# Patient Record
Sex: FEMALE | Race: WHITE
[De-identification: names, ages, dates, MRNs, and addresses within clinical notes are randomized per-mention and may not be internally consistent; named-entity substitution may affect disease eponyms.]

---

## 2017-11-16 ENCOUNTER — HOSPITAL ENCOUNTER (EMERGENCY)
Dept: HOSPITAL 17 - PHED | Age: 47
Discharge: HOME | End: 2017-11-16
Payer: MEDICARE

## 2017-11-16 VITALS
TEMPERATURE: 99.3 F | HEART RATE: 90 BPM | DIASTOLIC BLOOD PRESSURE: 101 MMHG | SYSTOLIC BLOOD PRESSURE: 161 MMHG | OXYGEN SATURATION: 99 % | RESPIRATION RATE: 16 BRPM

## 2017-11-16 VITALS — SYSTOLIC BLOOD PRESSURE: 153 MMHG | DIASTOLIC BLOOD PRESSURE: 91 MMHG

## 2017-11-16 VITALS — WEIGHT: 231.49 LBS | BODY MASS INDEX: 45.45 KG/M2 | HEIGHT: 60 IN

## 2017-11-16 DIAGNOSIS — R05: Primary | ICD-10-CM

## 2017-11-16 DIAGNOSIS — G80.9: ICD-10-CM

## 2017-11-16 PROCEDURE — 71010: CPT

## 2017-11-16 PROCEDURE — 99284 EMERGENCY DEPT VISIT MOD MDM: CPT

## 2017-11-16 PROCEDURE — 87804 INFLUENZA ASSAY W/OPTIC: CPT

## 2017-11-16 NOTE — RADRPT
EXAM DATE/TIME:  11/16/2017 18:58 

 

[facility] COMPARISON:     

No previous studies available for comparison.

 

                     

INDICATIONS :     

Cough and congestion on set today. 

                     

 

MEDICAL HISTORY :            

Cerebral palsy.    

 

SURGICAL HISTORY :     

None.   

 

ENCOUNTER:     

Initial                                        

 

ACUITY:     

1 day      

 

PAIN SCORE:     

0/10

 

LOCATION:     

Bilateral chest 

 

FINDINGS:     

The lungs demonstrate low volume. The heart size is difficult to assess given the position of the eric
phragms. A focal consolidation is not seen.

 

CONCLUSION:     No acute disease.  

 

 

 

 Tomy Tubbs MD on November 16, 2017 at 19:41           

Board Certified Radiologist.

 This report was verified electronically.

## 2017-11-16 NOTE — PD
HPI


Chief Complaint:  Cold / Flu Symptoms


Time Seen by Provider:  18:51


Travel History


International Travel<30 days:  No


Contact w/Intl Traveler<30days:  No


Traveled to known affect area:  No





History of Present Illness


HPI


45yo F with PMH of cerebral palsy here with c/o cough for 1 day.  Pt is here 

with her mother and care taker.  Mother said she gets URI twice a year and 

normally responds to z-tima.  Pt is her usual self.  Denies any fever, 

retractions, vomiting.  History is limited due to clinical condition of patient.





PFSH


Past Medical History


Cerebral Palsy:  Yes


Diminished Hearing:  Yes


Immunizations Current:  Yes





Social History


Alcohol Use:  No


Tobacco Use:  No


Substance Use:  No





Allergies-Medications


(Allergen,Severity, Reaction):  


Coded Allergies:  


     No Known Allergies (Verified  Allergy, Unknown, 11/16/17)


Reported Meds & Prescriptions





Reported Meds & Active Scripts


Active


Zithromax Z-Tima (Azithromycin) 250 Mg Dspk 250 Mg PO AS DIRECTED


     500 MG (2 tabs) day 1, then 1 tab days 2-5.








Review of Systems


Except as stated in HPI:  all other systems reviewed are Neg





Physical Exam


Narrative


GENERAL: 45yo F not in distress.


SKIN: Focused skin assessment warm/dry.


HEAD: Atraumatic. Normocephalic. 


EYES: Pupils equal and round at 3mm bilaterally.


ENT: No nasal bleeding or discharge.  Mucous membranes pink and moist.  Cerumen 

in bilateral ears.  


NECK: Trachea midline. No JVD. 


CARDIOVASCULAR: Regular rate and rhythm.  No murmur appreciated.


RESPIRATORY: No accessory muscle use.Decreased breath sounds bilateral lower 

lungs.  Could be secondary to effort.  


GASTROINTESTINAL: Abdomen soft, non-tender, nondistended. 


MUSCULOSKELETAL: No obvious deformities. No clubbing.  No cyanosis.  No edema. 


NEUROLOGICAL: Awake and alert. Baseline mental status.  Does not follow command 

normally and nonverbal.





Data


Data


Last Documented VS





Vital Signs








  Date Time  Temp Pulse Resp B/P (MAP) Pulse Ox O2 Delivery O2 Flow Rate FiO2


 


11/16/17 20:35  90 20 153/91 (111) 96   


 


11/16/17 18:18 99.3       








Orders





 Orders


Chest, Single Ap (11/16/17 )


Influenzae A/B Antigen (11/16/17 18:52)


Guaifenesin Liq (Robitussin Liq) (11/16/17 19:15)


Ed Discharge Order (11/16/17 20:31)








[facility]


Medical Decision Making


Medical Screen Exam Complete:  Yes


Emergency Medical Condition:  Yes


Differential Diagnosis


Pneumonia vs. URI vs. bronchitis


Narrative Course


45yo F with cerebral palsy here with cough.  Temp is 99.3F.  O2 sat 99% on RA. 

  Pt seen at end of my shift, will obtain CXR and influenza.  Sign out to next 

team to follow up results and reevaluate.





Diagnosis





 Primary Impression:  


 Cough


Scripts


Azithromycin (Zithromax Z-Tima) 250 Mg Dspk


250 MG PO AS DIRECTED for Infection, #1 DSPK 0 Refills


   500 MG (2 tabs) day 1, then 1 tab days 2-5.


   Prov: Caroline Betts MD         11/16/17











Anaya Salazar DO Nov 16, 2017 18:54

## 2017-11-16 NOTE — PD
Physical Exam


Date Seen by Provider:  Nov 16, 2017


Time Seen by Provider:  19:33


Narrative


Accepted in transfer of care from Dr. Salazar


GENERAL: Well-developed female in no acute distress no respiratory distress


SKIN: Warm and dry.


CARDIOVASCULAR: Regular rate and rhythm without murmurs, gallops, or rubs. 


RESPIRATORY: Breath sounds equal bilaterally. No accessory muscle use.











Data


Data


Last Documented VS





Vital Signs








  Date Time  Temp Pulse Resp B/P (MAP) Pulse Ox O2 Delivery O2 Flow Rate FiO2


 


11/16/17 18:18 99.3 90 16 161/101 (121) 99   








Orders





 Orders


Chest, Single Ap (11/16/17 )


Influenzae A/B Antigen (11/16/17 18:52)


Guaifenesin Liq (Robitussin Liq) (11/16/17 19:15)


Ed Discharge Order (11/16/17 20:31)








[facility]


Medical Record Reviewed:  Yes


Supervised Visit with KAC[facility]:  No


Interpretation(s)


influenza a/b ag: negative





Last Impressions








Chest X-Ray 11/16/17 0000 Signed





Impressions: 





 Service Date/Time:  Thursday, November 16, 2017 18:58 - CONCLUSION: No acute 





 disease.       Tomy Tubbs MD 








Vital Signs








  Date Time  Temp Pulse Resp B/P (MAP) Pulse Ox O2 Delivery O2 Flow Rate FiO2


 


11/16/17 18:18 99.3 90 16 161/101 (121) 99   








Differential Diagnosis


Accepted in transfer of care from Dr. Salazar; please refer to her dictation


Narrative Course


Accepted in transfer of care from Dr. Salazar; follow up on pending CXR/labs and 

disposition 


At 8:27 PM patient and mother informed of x-ray and flu results; mother is 

insistent that patient has this type of presentation twice a year and she does 

not start early on an azithromycin Z-Tima that she becomes ill very quickly and 

that this is her typical presentation of starting with congestion cough low-

grade temperature elevation and then progresses unless she is treated early 

with a Z-Tima.  Mother informed chest x-ray reveals no acute process and flu 

test is negative Z-Tima is provided for possible early bronchitis.


Diagnosis





 Primary Impression:  


 Cough


Referrals:  


Primary Care Physician


call for appointment


Patient Instructions:  General Instructions





***Additional Instruction:  


Encourage/increase fluid hydration


Give acetaminophen/Tylenol for fever 100.4F or greater


Complete course of antibiotic as prescribed


Return to the emergency department for any concerns or change in condition


Follow up with primary care provider


***Med/Other Pt SpecificInfo:  Prescription(s) given


Scripts


Azithromycin (Zithromax Z-Tima) 250 Mg Dspk


250 MG PO AS DIRECTED for Infection, #1 DSPK 0 Refills


   500 MG (2 tabs) day 1, then 1 tab days 2-5.


   Prov: Caroline Betts MD         11/16/17


Disposition:  01 DISCHARGE HOME


Condition:  Stable











Caroline Betts MD Nov 16, 2017 19:34

## 2018-02-19 ENCOUNTER — HOSPITAL ENCOUNTER (EMERGENCY)
Dept: HOSPITAL 17 - PHEFT | Age: 48
Discharge: HOME | End: 2018-02-19
Payer: MEDICARE

## 2018-02-19 VITALS
RESPIRATION RATE: 12 BRPM | TEMPERATURE: 99.5 F | SYSTOLIC BLOOD PRESSURE: 155 MMHG | DIASTOLIC BLOOD PRESSURE: 112 MMHG | HEART RATE: 97 BPM | OXYGEN SATURATION: 98 %

## 2018-02-19 VITALS — WEIGHT: 231.04 LBS | BODY MASS INDEX: 43.62 KG/M2 | HEIGHT: 61 IN

## 2018-02-19 DIAGNOSIS — J40: Primary | ICD-10-CM

## 2018-02-19 DIAGNOSIS — G80.9: ICD-10-CM

## 2018-02-19 PROCEDURE — 99283 EMERGENCY DEPT VISIT LOW MDM: CPT

## 2018-02-19 PROCEDURE — 94664 DEMO&/EVAL PT USE INHALER: CPT

## 2018-02-19 NOTE — PD
HPI


Chief Complaint:  Cold / Flu Symptoms


Time Seen by Provider:  19:35


Travel History


International Travel<30 days:  No


Contact w/Intl Traveler<30days:  No


Traveled to known affect area:  No





History of Present Illness


HPI


Patient comes to the emergency department with family complaining of cough 

ongoing for a week.  Mother reports it got worse last night.  Denies giving 

anything for this.  Denies any fevers, nausea, vomiting, or patient complaining 

of anything.  Patient is nonverbal thus limiting H&P.  Mom reports seeing 

primary care doctor shortly after this started was not prescribed anything as 

it was not that bad.  Mom does not believe patient can do an inhaler but does 

have a nebulizer machine at home that she can use.





PFSH


Past Medical History


Cerebral Palsy:  Yes


Diminished Hearing:  Yes


Immunizations Current:  Yes (UTD)


Tetanus Vaccination:  Unknown


Pregnant?:  Not Pregnant





Social History


Alcohol Use:  No


Tobacco Use:  No


Substance Use:  No





Allergies-Medications


(Allergen,Severity, Reaction):  


Coded Allergies:  


     No Known Allergies (Verified  Allergy, Unknown, 11/16/17)


Reported Meds & Prescriptions





Reported Meds & Active Scripts


Active


Zithromax Z-Tima (Azithromycin) 250 Mg Dspk 250 Mg PO AS DIRECTED


     500 MG (2 tabs) day 1, then 1 tab days 2-5.


Albuterol Neb (Albuterol Sulfate) 2.5 Mg/3 Ml Neb 2.5 Mg NEB Q4HR NEB PRN


Zithromax Z-Tima (Azithromycin) 250 Mg Dspk 250 Mg PO AS DIRECTED


     500 MG (2 tabs) day 1, then 1 tab days 2-5.








Review of Systems


Except as stated in HPI:  all other systems reviewed are Neg





Physical Exam


Narrative


GENERAL: Well-developed, overly nourished, in no acute distress, and non-ill 

appearing.


SKIN: Focused skin assessment warm and dry.


HEAD: Atraumatic. Normocephalic. 


EYES: Pupils equal and round. EOMI. No scleral icterus. No injection or 

drainage. 


ENT: No nasal bleeding or discharge.  Mucous membranes pink and moist.


NECK: Trachea midline. No cervical lymphadenopathy. Supple.  No nuclear 

rigidity.


CARDIOVASCULAR: Regular rate and rhythm.  No murmur appreciated.


RESPIRATORY: No accessory muscle use.  No respiratory distress. Clear to 

auscultation. Breath sounds equal bilaterally.  Hacking cough noted on exam.


MUSCULOSKELETAL: No obvious deformities. No clubbing.  No cyanosis.  No edema.  

Full range of motion.


NEUROLOGICAL: Awake and alert. No obvious cranial nerve deficits.  Motor 

grossly within normal limits.





Data


Data


Last Documented VS





Vital Signs








  Date Time  Temp Pulse Resp B/P (MAP) Pulse Ox O2 Delivery O2 Flow Rate FiO2


 


2/19/18 20:07        


 


2/19/18 19:25 99.5 97 12  98   








Orders





 Orders


Albuterol-Ipratropium Neb (Duoneb Neb) (2/19/18 19:45)


Ed Discharge Order (2/19/18 20:04)








[facility]


Medical Decision Making


Medical Screen Exam Complete:  Yes


Emergency Medical Condition:  Yes


Differential Diagnosis


URI, pneumonia, bronchitis, viral syndrome, cough


Narrative Course


Patients symptom complex is consistent with bronchitis. The patient is non-ill 

appearing and is in no respiratory distress and comfortable. The patient moves 

air well and oxygen saturations are normal. There is no clinical evidence to 

suggest pneumonia at this time. Plan of care and management were discussed with 

the patient who agreed with plan.The patient was instructed to follow up with 

their physician and instructed to return if worsens, progressively worsening 

shortness of breath or difficulty breathing, persistent fever, chest pains or 

discomfort, inability to keep medication or fluids down with or without vomiting

, or as needed.





Patient in no obvious distress upon re-evaluation. Patient's mother was asked 

if they wanted to speak to my attending, which the patient did not wish to do 

at this time.  Any questions/concerns in reference to patient diagnosis/

condition discussed and clarified prior to patient's discharge. Reinforced 

sheer importance of close follow up with patient's primary physician or primary 

care clinic. Instructed patient to return to ED immediately, if symptoms return/

worsen. Patient's mother showed understanding of above instructions.  Further 

instructions and recommendations were detailed in discharge paperwork.  Patient 

ambulated without difficulty out of ED at discharge.





Diagnosis





 Primary Impression:  


 Bronchitis


Patient Instructions:  Acute Bronchitis (ED), General Instructions





***Additional Instructions:  


Follow-up with your primary care physician in 3-5 days for reevaluation.  Take 

all medication as prescribed.  Encourage plenty of non-caffeinated and 

nonalcoholic fluids.  Return to the emergency department if symptoms get worse.


***Med/Other Pt SpecificInfo:  Prescription(s) given


Scripts


Azithromycin (Zithromax Z-Tima) 250 Mg Dspk


250 MG PO AS DIRECTED for Infection, #1 DSPK 0 Refills


   500 MG (2 tabs) day 1, then 1 tab days 2-5.


   Prov: Michael Carballo MD         2/19/18 


Albuterol Neb (Albuterol Neb) 2.5 Mg/3 Ml Neb


2.5 MG NEB Q4HR NEB Y for COUGH, #60 NEBULE 0 Refills


   Prov: Michael Carballo MD         2/19/18


Disposition:  01 DISCHARGE HOME


Condition:  Stable











Rigoberto Shaikh Feb 19, 2018 19:47